# Patient Record
Sex: FEMALE | URBAN - METROPOLITAN AREA
[De-identification: names, ages, dates, MRNs, and addresses within clinical notes are randomized per-mention and may not be internally consistent; named-entity substitution may affect disease eponyms.]

---

## 2022-09-19 PROBLEM — Z00.00 ENCOUNTER FOR PREVENTIVE HEALTH EXAMINATION: Status: ACTIVE | Noted: 2022-09-19

## 2022-10-07 ENCOUNTER — OUTPATIENT (OUTPATIENT)
Dept: OUTPATIENT SERVICES | Facility: HOSPITAL | Age: 82
LOS: 1 days | Discharge: HOME | End: 2022-10-07

## 2022-10-07 DIAGNOSIS — E83.10 DISORDER OF IRON METABOLISM, UNSPECIFIED: ICD-10-CM

## 2022-10-11 ENCOUNTER — APPOINTMENT (OUTPATIENT)
Dept: HEMATOLOGY ONCOLOGY | Facility: CLINIC | Age: 82
End: 2022-10-11

## 2023-11-07 ENCOUNTER — OFFICE (OUTPATIENT)
Dept: URBAN - METROPOLITAN AREA CLINIC 90 | Facility: CLINIC | Age: 83
Setting detail: OPHTHALMOLOGY
End: 2023-11-07
Payer: MEDICARE

## 2023-11-07 DIAGNOSIS — H40.013: ICD-10-CM

## 2023-11-07 DIAGNOSIS — H10.45: ICD-10-CM

## 2023-11-07 DIAGNOSIS — H25.13: ICD-10-CM

## 2023-11-07 PROCEDURE — 92014 COMPRE OPH EXAM EST PT 1/>: CPT | Performed by: OPHTHALMOLOGY

## 2023-11-07 PROCEDURE — 92133 CPTRZD OPH DX IMG PST SGM ON: CPT | Performed by: OPHTHALMOLOGY

## 2023-11-07 PROCEDURE — 92083 EXTENDED VISUAL FIELD XM: CPT | Performed by: OPHTHALMOLOGY

## 2023-11-07 ASSESSMENT — REFRACTION_MANIFEST
OD_ADD: +2.50
OD_VA1: 20/30
OD_ADD: +3.00
OS_VA1: 20/30
OD_AXIS: 087
OD_CYLINDER: -0.75
OS_CYLINDER: -1.00
OD_VA1: 20/30+
OS_VA2: 20/25
OD_VA2: 20/25
OS_CYLINDER: -1.25
OS_AXIS: 085
OD_SPHERE: +4.50
OS_SPHERE: +4.50
OD_CYLINDER: SPH
OD_SPHERE: +3.50
OS_AXIS: 078
OS_ADD: +3.00
OS_SPHERE: +5.25
OS_ADD: +2.50
OS_VA1: 20/30-

## 2023-11-07 ASSESSMENT — REFRACTION_CURRENTRX
OD_ADD: +2.50
OS_CYLINDER: -1.75
OD_AXIS: 171
OS_VPRISM_DIRECTION: PROGS
OD_CYLINDER: SPH
OD_VPRISM_DIRECTION: PROGS
OS_AXIS: 098
OS_VPRISM_DIRECTION: PROGS
OD_ADD: +3.00
OS_CYLINDER: -1.00
OS_VPRISM_DIRECTION: PROGS
OS_SPHERE: +4.00
OD_CYLINDER: -0.50
OD_VPRISM_DIRECTION: PROGS
OS_ADD: +2.50
OS_CYLINDER: -0.25
OS_ADD: +2.75
OD_SPHERE: +3.75
OS_VPRISM_DIRECTION: SV
OD_ADD: +2.50
OD_ADD: +2.50
OS_AXIS: 086
OD_SPHERE: +3.25
OD_CYLINDER: SPH
OS_CYLINDER: -0.50
OS_VPRISM_DIRECTION: PROGS
OS_SPHERE: +3.75
OS_ADD: +2.50
OS_AXIS: 088
OD_OVR_VA: 20/
OD_VPRISM_DIRECTION: PROGS
OD_SPHERE: +3.75
OD_CYLINDER: SPHERE
OD_VPRISM_DIRECTION: SV
OD_OVR_VA: 20/
OD_CYLINDER: -0.75
OD_VPRISM_DIRECTION: PROGS
OD_OVR_VA: 20/
OD_AXIS: 000
OS_ADD: +2.75
OD_AXIS: 060
OS_OVR_VA: 20/
OS_AXIS: 144
OS_AXIS: 175
OS_OVR_VA: 20/
OD_SPHERE: +4.75
OS_SPHERE: +6.25
OS_OVR_VA: 20/
OS_CYLINDER: +0.50
OD_SPHERE: +3.25
OS_SPHERE: +3.25

## 2023-11-07 ASSESSMENT — REFRACTION_AUTOREFRACTION
OS_AXIS: 088
OD_AXIS: 103
OS_SPHERE: +5.50
OD_CYLINDER: -1.25
OS_CYLINDER: -1.25
OD_SPHERE: +4.75

## 2023-11-07 ASSESSMENT — SPHEQUIV_DERIVED
OS_SPHEQUIV: 4.625
OD_SPHEQUIV: 4.125
OS_SPHEQUIV: 4.875
OD_SPHEQUIV: 4.125
OS_SPHEQUIV: 4

## 2023-11-07 ASSESSMENT — CONFRONTATIONAL VISUAL FIELD TEST (CVF)
OS_FINDINGS: FULL
OD_FINDINGS: FULL

## 2023-12-24 ENCOUNTER — EMERGENCY (EMERGENCY)
Facility: HOSPITAL | Age: 83
LOS: 1 days | Discharge: HOME | End: 2023-12-24
Attending: STUDENT IN AN ORGANIZED HEALTH CARE EDUCATION/TRAINING PROGRAM
Payer: MEDICARE

## 2023-12-24 VITALS
WEIGHT: 89.95 LBS | HEART RATE: 102 BPM | DIASTOLIC BLOOD PRESSURE: 98 MMHG | RESPIRATION RATE: 18 BRPM | HEIGHT: 60 IN | TEMPERATURE: 98 F | SYSTOLIC BLOOD PRESSURE: 174 MMHG | OXYGEN SATURATION: 94 %

## 2023-12-24 VITALS
DIASTOLIC BLOOD PRESSURE: 94 MMHG | TEMPERATURE: 98 F | RESPIRATION RATE: 18 BRPM | HEART RATE: 98 BPM | OXYGEN SATURATION: 96 % | SYSTOLIC BLOOD PRESSURE: 174 MMHG

## 2023-12-24 PROCEDURE — 99282 EMERGENCY DEPT VISIT SF MDM: CPT

## 2023-12-24 PROCEDURE — 99283 EMERGENCY DEPT VISIT LOW MDM: CPT

## 2023-12-24 NOTE — ED PROVIDER NOTE - PATIENT PORTAL LINK FT
You can access the FollowMyHealth Patient Portal offered by [facility] by registering at the following website: [email or web address]. By joining [employer]’s FollowMyHealth portal, you will also be able to view your health information using other applications (apps) compatible with our system. You can access the FollowMyHealth Patient Portal offered by [facility] by registering at the following website: [email or web address]. By joining [employer]’s FollowMyHealth portal, you will also be able to view your health information using other applications (apps) compatible with our system.

## 2023-12-24 NOTE — ED PROVIDER NOTE - CLINICAL SUMMARY MEDICAL DECISION MAKING FREE TEXT BOX
Attending MD SANDIE Ojeda I performed a history and physical exam of the patient. My medical decision making and observations are as follows:    83 F with PMH stomach ulcer presenting for elevated blood pressure readings at home.  Patient has been in usual state of health and noted elevated blood pressure readings today up to 190s systolic in the setting of eating a whole pastrami sandwich last night, which is unusual for her because of the excessive salt content.  Patient regularly checks her blood pressure with a home BP cuff, at baseline blood pressure is in the 130s to 150s.  Due to persistently elevated blood pressure readings patient presenting to ED for evaluation.  At no time did patient experience headache, change in hearing or vision, dizziness or lightheadedness, focal weakness, paresthesias, chest pain, shortness of breath, cough, abdominal pain, GI symptoms, dysuria, change in urine output.    On exam, patient in no acute distress, heart lungs clear to auscultation, abdomen soft nontender, no pedal edema, no CVA tenderness, mucous membranes moist.    MDM–elderly female with no significant medical history presenting for asymptomatic hypertension after eating excessively salty food.  Counseled patient on avoiding excessive salt consumption and dangers of artificially lowering blood pressure with antihypertensives since her baseline blood pressure is in appropriate range.  Advised patient to continue usual diet and over the next hours to days her kidneys will eliminate excessive salt content. Given lack of other symptoms, no indication for further evaluation at this time.  Patient will follow-up with her primary doctor.  Discussed return precautions, stable for discharge.

## 2023-12-24 NOTE — ED ADULT TRIAGE NOTE - NSSEPSISSUSPECTED_ED_A_ED
Avery 7 score of 6  phq  Score of 9    Dx anxiety with depression  Pt with recurrent feelings of crying    Plan:  We will be starting FLUOXETINE 20 MG daily for you to help with your depression.  * It is very important to start cardiovascular exercise 3-4 times a week for 40 minute at a time.  * This medication may take 6 weeks to fully start working, so as long as you are not getting any side effects please continue to take it regularly  * If you follow a [employer], it is a good time to seek out help from a counsellor.   * If suicidal thoughts please call the [facility]OT[facility] [phone] or go to the ER  * Please realize that depression is treatable with a combination of Medication/Exercise/Therapy/Patience/Communication and family support.        
No

## 2023-12-24 NOTE — ED ADULT NURSE NOTE - OBJECTIVE STATEMENT
83y Female no sig PMHx presenting to ED via walk-in triage for high BP at home. Pt states she ate a lot of pastrami and cold cuts and her BP went from 140s to 170s. Pt denying any complaints and wants to go home. Gross neuro intact. Pt still currently hypertensive.

## 2024-01-04 NOTE — ED PROVIDER NOTE - NSFOLLOWUPINSTRUCTIONS_ED_ALL_ED_FT
No Please follow-up with your primary doctor in the next few days.  Return to the emergency department immediately if you develop sudden onset weakness or numbness, intractable headache, chest pain, shortness of breath or difficulty breathing, abdominal pain, vomiting, flank pain, change in urine output, or any other symptoms that are concerning to you.
